# Patient Record
Sex: FEMALE | Race: WHITE | NOT HISPANIC OR LATINO | URBAN - METROPOLITAN AREA
[De-identification: names, ages, dates, MRNs, and addresses within clinical notes are randomized per-mention and may not be internally consistent; named-entity substitution may affect disease eponyms.]

---

## 2017-03-27 ENCOUNTER — OUTPATIENT (OUTPATIENT)
Dept: OUTPATIENT SERVICES | Facility: HOSPITAL | Age: 47
LOS: 1 days | Discharge: HOME | End: 2017-03-27

## 2017-06-27 DIAGNOSIS — Z52.001 UNSPECIFIED DONOR, STEM CELLS: ICD-10-CM

## 2022-02-17 PROBLEM — Z00.00 ENCOUNTER FOR PREVENTIVE HEALTH EXAMINATION: Status: ACTIVE | Noted: 2022-02-17

## 2022-02-22 ENCOUNTER — APPOINTMENT (OUTPATIENT)
Dept: CARDIOLOGY | Facility: CLINIC | Age: 52
End: 2022-02-22

## 2022-04-14 ENCOUNTER — OUTPATIENT (OUTPATIENT)
Dept: OUTPATIENT SERVICES | Facility: HOSPITAL | Age: 52
LOS: 1 days | Discharge: HOME | End: 2022-04-14
Payer: MEDICAID

## 2022-04-14 VITALS
DIASTOLIC BLOOD PRESSURE: 86 MMHG | HEART RATE: 86 BPM | TEMPERATURE: 98 F | RESPIRATION RATE: 16 BRPM | WEIGHT: 179.02 LBS | OXYGEN SATURATION: 98 % | HEIGHT: 67 IN | SYSTOLIC BLOOD PRESSURE: 154 MMHG

## 2022-04-14 DIAGNOSIS — Z01.818 ENCOUNTER FOR OTHER PREPROCEDURAL EXAMINATION: ICD-10-CM

## 2022-04-14 DIAGNOSIS — S83.231D COMPLEX TEAR OF MEDIAL MENISCUS, CURRENT INJURY, RIGHT KNEE, SUBSEQUENT ENCOUNTER: ICD-10-CM

## 2022-04-14 LAB
ALBUMIN SERPL ELPH-MCNC: 5.1 G/DL — SIGNIFICANT CHANGE UP (ref 3.5–5.2)
ALP SERPL-CCNC: 181 U/L — HIGH (ref 30–115)
ALT FLD-CCNC: 34 U/L — SIGNIFICANT CHANGE UP (ref 0–41)
ANION GAP SERPL CALC-SCNC: 17 MMOL/L — HIGH (ref 7–14)
APTT BLD: 47.2 SEC — HIGH (ref 27–39.2)
AST SERPL-CCNC: 31 U/L — SIGNIFICANT CHANGE UP (ref 0–41)
BASOPHILS # BLD AUTO: 0.12 K/UL — SIGNIFICANT CHANGE UP (ref 0–0.2)
BASOPHILS NFR BLD AUTO: 1.2 % — HIGH (ref 0–1)
BILIRUB SERPL-MCNC: 0.4 MG/DL — SIGNIFICANT CHANGE UP (ref 0.2–1.2)
BUN SERPL-MCNC: 12 MG/DL — SIGNIFICANT CHANGE UP (ref 10–20)
CALCIUM SERPL-MCNC: 10 MG/DL — SIGNIFICANT CHANGE UP (ref 8.5–10.1)
CHLORIDE SERPL-SCNC: 81 MMOL/L — LOW (ref 98–110)
CO2 SERPL-SCNC: 25 MMOL/L — SIGNIFICANT CHANGE UP (ref 17–32)
CREAT SERPL-MCNC: 0.6 MG/DL — LOW (ref 0.7–1.5)
EGFR: 107 ML/MIN/1.73M2 — SIGNIFICANT CHANGE UP
EOSINOPHIL # BLD AUTO: 0.5 K/UL — SIGNIFICANT CHANGE UP (ref 0–0.7)
EOSINOPHIL NFR BLD AUTO: 5.1 % — SIGNIFICANT CHANGE UP (ref 0–8)
GLUCOSE SERPL-MCNC: 93 MG/DL — SIGNIFICANT CHANGE UP (ref 70–99)
HCT VFR BLD CALC: 40.7 % — SIGNIFICANT CHANGE UP (ref 37–47)
HGB BLD-MCNC: 14.7 G/DL — SIGNIFICANT CHANGE UP (ref 12–16)
IMM GRANULOCYTES NFR BLD AUTO: 0.5 % — HIGH (ref 0.1–0.3)
INR BLD: 0.9 RATIO — SIGNIFICANT CHANGE UP (ref 0.65–1.3)
LYMPHOCYTES # BLD AUTO: 1.01 K/UL — LOW (ref 1.2–3.4)
LYMPHOCYTES # BLD AUTO: 10.3 % — LOW (ref 20.5–51.1)
MCHC RBC-ENTMCNC: 35 PG — HIGH (ref 27–31)
MCHC RBC-ENTMCNC: 36.1 G/DL — SIGNIFICANT CHANGE UP (ref 32–37)
MCV RBC AUTO: 96.9 FL — SIGNIFICANT CHANGE UP (ref 81–99)
MONOCYTES # BLD AUTO: 0.75 K/UL — HIGH (ref 0.1–0.6)
MONOCYTES NFR BLD AUTO: 7.7 % — SIGNIFICANT CHANGE UP (ref 1.7–9.3)
NEUTROPHILS # BLD AUTO: 7.37 K/UL — HIGH (ref 1.4–6.5)
NEUTROPHILS NFR BLD AUTO: 75.2 % — SIGNIFICANT CHANGE UP (ref 42.2–75.2)
NRBC # BLD: 0 /100 WBCS — SIGNIFICANT CHANGE UP (ref 0–0)
PLATELET # BLD AUTO: 463 K/UL — HIGH (ref 130–400)
POTASSIUM SERPL-MCNC: 5.3 MMOL/L — HIGH (ref 3.5–5)
POTASSIUM SERPL-SCNC: 5.3 MMOL/L — HIGH (ref 3.5–5)
PROT SERPL-MCNC: 8 G/DL — SIGNIFICANT CHANGE UP (ref 6–8)
PROTHROM AB SERPL-ACNC: 10.4 SEC — SIGNIFICANT CHANGE UP (ref 9.95–12.87)
RBC # BLD: 4.2 M/UL — SIGNIFICANT CHANGE UP (ref 4.2–5.4)
RBC # FLD: 11.2 % — LOW (ref 11.5–14.5)
SODIUM SERPL-SCNC: 123 MMOL/L — LOW (ref 135–146)
WBC # BLD: 9.8 K/UL — SIGNIFICANT CHANGE UP (ref 4.8–10.8)
WBC # FLD AUTO: 9.8 K/UL — SIGNIFICANT CHANGE UP (ref 4.8–10.8)

## 2022-04-14 PROCEDURE — 93010 ELECTROCARDIOGRAM REPORT: CPT

## 2022-04-14 RX ORDER — CLONAZEPAM 1 MG
1 TABLET ORAL
Qty: 0 | Refills: 0 | DISCHARGE

## 2022-04-14 NOTE — H&P PST ADULT - NSICDXPASTMEDICALHX_GEN_ALL_CORE_FT
PAST MEDICAL HISTORY:  Bipolar disorder anxiety and depression -denies suicidal ideas    HTN (hypertension)

## 2022-04-14 NOTE — H&P PST ADULT - HISTORY OF PRESENT ILLNESS
51 yr old female states pain RT knee  for about 6months -progressively worsened -" I did PT for a while like 4 months but it got worse" difficulty negotiating stairs and limits daily  activities d/t pain is scheduled RIGHT KNEE ARTHROSCOPY MEDIAL AND LATERAL MENISCECTOMY. Denies COVID S/S. Recd 3 doses vaccine. Verbalized understanding of COVID prevention measures. Exercise bhakti 1-2 FOS very slowly with support LTD by pain RT knee.  Anesthesia Alert  NO--Difficult Airway  NO--History of neck surgery or radiation  NO--Limited ROM of neck  NO--History of Malignant hyperthermia  NO--Personal or family history of Pseudocholinesterase deficiency  NO--Prior Anesthesia Complication  NO--Latex Allergy  NO--Loose teeth  NO--History of Rheumatoid Arthritis  NO--TIM  No Bleeding risk  NO--Other_____

## 2022-04-15 ENCOUNTER — INPATIENT (INPATIENT)
Facility: HOSPITAL | Age: 52
LOS: 0 days | Discharge: HOME | End: 2022-04-16
Attending: HOSPITALIST | Admitting: HOSPITALIST
Payer: MEDICAID

## 2022-04-15 VITALS — HEIGHT: 67 IN

## 2022-04-15 PROBLEM — I10 ESSENTIAL (PRIMARY) HYPERTENSION: Chronic | Status: ACTIVE | Noted: 2022-04-14

## 2022-04-15 PROBLEM — F31.9 BIPOLAR DISORDER, UNSPECIFIED: Chronic | Status: ACTIVE | Noted: 2022-04-14

## 2022-04-15 LAB
ALBUMIN SERPL ELPH-MCNC: 5.5 G/DL — HIGH (ref 3.5–5.2)
ALP SERPL-CCNC: 176 U/L — HIGH (ref 30–115)
ALT FLD-CCNC: 35 U/L — SIGNIFICANT CHANGE UP (ref 0–41)
ANION GAP SERPL CALC-SCNC: 11 MMOL/L — SIGNIFICANT CHANGE UP (ref 7–14)
ANION GAP SERPL CALC-SCNC: 14 MMOL/L — SIGNIFICANT CHANGE UP (ref 7–14)
APPEARANCE UR: CLEAR — SIGNIFICANT CHANGE UP
AST SERPL-CCNC: 32 U/L — SIGNIFICANT CHANGE UP (ref 0–41)
BASOPHILS # BLD AUTO: 0.12 K/UL — SIGNIFICANT CHANGE UP (ref 0–0.2)
BASOPHILS NFR BLD AUTO: 1.1 % — HIGH (ref 0–1)
BILIRUB SERPL-MCNC: 0.6 MG/DL — SIGNIFICANT CHANGE UP (ref 0.2–1.2)
BILIRUB UR-MCNC: NEGATIVE — SIGNIFICANT CHANGE UP
BUN SERPL-MCNC: 12 MG/DL — SIGNIFICANT CHANGE UP (ref 10–20)
BUN SERPL-MCNC: 15 MG/DL — SIGNIFICANT CHANGE UP (ref 10–20)
CALCIUM SERPL-MCNC: 10.3 MG/DL — HIGH (ref 8.5–10.1)
CALCIUM SERPL-MCNC: 9.9 MG/DL — SIGNIFICANT CHANGE UP (ref 8.5–10.1)
CHLORIDE SERPL-SCNC: 80 MMOL/L — LOW (ref 98–110)
CHLORIDE SERPL-SCNC: 90 MMOL/L — LOW (ref 98–110)
CO2 SERPL-SCNC: 26 MMOL/L — SIGNIFICANT CHANGE UP (ref 17–32)
CO2 SERPL-SCNC: 27 MMOL/L — SIGNIFICANT CHANGE UP (ref 17–32)
COLOR SPEC: YELLOW — SIGNIFICANT CHANGE UP
CREAT SERPL-MCNC: 0.6 MG/DL — LOW (ref 0.7–1.5)
CREAT SERPL-MCNC: 0.7 MG/DL — SIGNIFICANT CHANGE UP (ref 0.7–1.5)
DIFF PNL FLD: NEGATIVE — SIGNIFICANT CHANGE UP
EGFR: 103 ML/MIN/1.73M2 — SIGNIFICANT CHANGE UP
EGFR: 107 ML/MIN/1.73M2 — SIGNIFICANT CHANGE UP
EOSINOPHIL # BLD AUTO: 0.38 K/UL — SIGNIFICANT CHANGE UP (ref 0–0.7)
EOSINOPHIL NFR BLD AUTO: 3.5 % — SIGNIFICANT CHANGE UP (ref 0–8)
GLUCOSE SERPL-MCNC: 133 MG/DL — HIGH (ref 70–99)
GLUCOSE SERPL-MCNC: 88 MG/DL — SIGNIFICANT CHANGE UP (ref 70–99)
GLUCOSE UR QL: NEGATIVE MG/DL — SIGNIFICANT CHANGE UP
HCT VFR BLD CALC: 43.6 % — SIGNIFICANT CHANGE UP (ref 37–47)
HGB BLD-MCNC: 15.4 G/DL — SIGNIFICANT CHANGE UP (ref 12–16)
IMM GRANULOCYTES NFR BLD AUTO: 0.5 % — HIGH (ref 0.1–0.3)
KETONES UR-MCNC: NEGATIVE — SIGNIFICANT CHANGE UP
LEUKOCYTE ESTERASE UR-ACNC: NEGATIVE — SIGNIFICANT CHANGE UP
LYMPHOCYTES # BLD AUTO: 1.04 K/UL — LOW (ref 1.2–3.4)
LYMPHOCYTES # BLD AUTO: 9.5 % — LOW (ref 20.5–51.1)
MCHC RBC-ENTMCNC: 34.1 PG — HIGH (ref 27–31)
MCHC RBC-ENTMCNC: 35.3 G/DL — SIGNIFICANT CHANGE UP (ref 32–37)
MCV RBC AUTO: 96.5 FL — SIGNIFICANT CHANGE UP (ref 81–99)
MONOCYTES # BLD AUTO: 0.59 K/UL — SIGNIFICANT CHANGE UP (ref 0.1–0.6)
MONOCYTES NFR BLD AUTO: 5.4 % — SIGNIFICANT CHANGE UP (ref 1.7–9.3)
NEUTROPHILS # BLD AUTO: 8.74 K/UL — HIGH (ref 1.4–6.5)
NEUTROPHILS NFR BLD AUTO: 80 % — HIGH (ref 42.2–75.2)
NITRITE UR-MCNC: NEGATIVE — SIGNIFICANT CHANGE UP
NRBC # BLD: 0 /100 WBCS — SIGNIFICANT CHANGE UP (ref 0–0)
OSMOLALITY UR: 192 MOS/KG — SIGNIFICANT CHANGE UP (ref 50–1200)
PH UR: 7 — SIGNIFICANT CHANGE UP (ref 5–8)
PLATELET # BLD AUTO: 457 K/UL — HIGH (ref 130–400)
POTASSIUM SERPL-MCNC: 4.9 MMOL/L — SIGNIFICANT CHANGE UP (ref 3.5–5)
POTASSIUM SERPL-MCNC: 5 MMOL/L — SIGNIFICANT CHANGE UP (ref 3.5–5)
POTASSIUM SERPL-SCNC: 4.9 MMOL/L — SIGNIFICANT CHANGE UP (ref 3.5–5)
POTASSIUM SERPL-SCNC: 5 MMOL/L — SIGNIFICANT CHANGE UP (ref 3.5–5)
PROT SERPL-MCNC: 8.1 G/DL — HIGH (ref 6–8)
PROT UR-MCNC: NEGATIVE MG/DL — SIGNIFICANT CHANGE UP
RBC # BLD: 4.52 M/UL — SIGNIFICANT CHANGE UP (ref 4.2–5.4)
RBC # FLD: 11.2 % — LOW (ref 11.5–14.5)
SARS-COV-2 RNA SPEC QL NAA+PROBE: SIGNIFICANT CHANGE UP
SODIUM SERPL-SCNC: 121 MMOL/L — LOW (ref 135–146)
SODIUM SERPL-SCNC: 127 MMOL/L — LOW (ref 135–146)
SODIUM UR-SCNC: <20 MMOL/L — SIGNIFICANT CHANGE UP
SP GR SPEC: 1.01 — SIGNIFICANT CHANGE UP (ref 1.01–1.03)
UROBILINOGEN FLD QL: 0.2 MG/DL — SIGNIFICANT CHANGE UP
WBC # BLD: 10.93 K/UL — HIGH (ref 4.8–10.8)
WBC # FLD AUTO: 10.93 K/UL — HIGH (ref 4.8–10.8)

## 2022-04-15 PROCEDURE — 93010 ELECTROCARDIOGRAM REPORT: CPT

## 2022-04-15 PROCEDURE — 99285 EMERGENCY DEPT VISIT HI MDM: CPT

## 2022-04-15 PROCEDURE — 99222 1ST HOSP IP/OBS MODERATE 55: CPT

## 2022-04-15 RX ORDER — CLONAZEPAM 1 MG
1 TABLET ORAL THREE TIMES A DAY
Refills: 0 | Status: DISCONTINUED | OUTPATIENT
Start: 2022-04-15 | End: 2022-04-16

## 2022-04-15 RX ORDER — NICOTINE POLACRILEX 2 MG
1 GUM BUCCAL DAILY
Refills: 0 | Status: DISCONTINUED | OUTPATIENT
Start: 2022-04-15 | End: 2022-04-16

## 2022-04-15 RX ORDER — ACETAMINOPHEN 500 MG
650 TABLET ORAL EVERY 6 HOURS
Refills: 0 | Status: DISCONTINUED | OUTPATIENT
Start: 2022-04-15 | End: 2022-04-16

## 2022-04-15 RX ORDER — OLANZAPINE 15 MG/1
15 TABLET, FILM COATED ORAL AT BEDTIME
Refills: 0 | Status: DISCONTINUED | OUTPATIENT
Start: 2022-04-15 | End: 2022-04-16

## 2022-04-15 RX ORDER — LOSARTAN POTASSIUM 100 MG/1
50 TABLET, FILM COATED ORAL DAILY
Refills: 0 | Status: DISCONTINUED | OUTPATIENT
Start: 2022-04-15 | End: 2022-04-16

## 2022-04-15 RX ORDER — SERTRALINE 25 MG/1
50 TABLET, FILM COATED ORAL DAILY
Refills: 0 | Status: DISCONTINUED | OUTPATIENT
Start: 2022-04-15 | End: 2022-04-16

## 2022-04-15 RX ORDER — ONDANSETRON 8 MG/1
4 TABLET, FILM COATED ORAL EVERY 8 HOURS
Refills: 0 | Status: DISCONTINUED | OUTPATIENT
Start: 2022-04-15 | End: 2022-04-16

## 2022-04-15 RX ORDER — CLONAZEPAM 1 MG
0 TABLET ORAL
Qty: 0 | Refills: 0 | DISCHARGE

## 2022-04-15 RX ORDER — LANOLIN ALCOHOL/MO/W.PET/CERES
3 CREAM (GRAM) TOPICAL AT BEDTIME
Refills: 0 | Status: DISCONTINUED | OUTPATIENT
Start: 2022-04-15 | End: 2022-04-16

## 2022-04-15 RX ORDER — LOSARTAN POTASSIUM 100 MG/1
1 TABLET, FILM COATED ORAL
Qty: 0 | Refills: 0 | DISCHARGE

## 2022-04-15 RX ORDER — CLONAZEPAM 1 MG
2 TABLET ORAL
Qty: 0 | Refills: 0 | DISCHARGE

## 2022-04-15 RX ORDER — ENOXAPARIN SODIUM 100 MG/ML
40 INJECTION SUBCUTANEOUS EVERY 24 HOURS
Refills: 0 | Status: DISCONTINUED | OUTPATIENT
Start: 2022-04-15 | End: 2022-04-16

## 2022-04-15 RX ADMIN — Medication 1 MILLIGRAM(S): at 21:55

## 2022-04-15 RX ADMIN — OLANZAPINE 15 MILLIGRAM(S): 15 TABLET, FILM COATED ORAL at 21:55

## 2022-04-15 RX ADMIN — ENOXAPARIN SODIUM 40 MILLIGRAM(S): 100 INJECTION SUBCUTANEOUS at 21:57

## 2022-04-15 RX ADMIN — Medication 1 PATCH: at 16:26

## 2022-04-15 RX ADMIN — Medication 3 MILLIGRAM(S): at 21:56

## 2022-04-15 NOTE — ED PROVIDER NOTE - ATTENDING CONTRIBUTION TO CARE
Patient sent to the emergency department for possible hyponatremia.  Patient was having preadmission surgical testing in anticipation of knee surgery.  Hyponatremia was noted on that assessment.  Via patient's only symptom at this time is mild lethargy.  She admits to going on a diet which involved drinking " gallons" of water.  No change in medication.  Patient does not take diuretics.  Vital signs noted.  NAD.  Neuro intact.  Chest is clear.  Case discussed with Dr. Euceda.  Patient is stable for the floor at this time.  Recommends repeating sodium in 12 hours.  Case discussed with Dr. Houser.  Needs fluid restriction.

## 2022-04-15 NOTE — H&P ADULT - NS ATTEND AMEND GEN_ALL_CORE FT
Patient seen and examined at bedside. Not in acute distress.   Agree with the assessment and plan above. Changes made to HPI and plan as needed.

## 2022-04-15 NOTE — PATIENT PROFILE ADULT - FALL HARM RISK - UNIVERSAL INTERVENTIONS
Bed in lowest position, wheels locked, appropriate side rails in place/Call bell, personal items and telephone in reach/Instruct patient to call for assistance before getting out of bed or chair/Non-slip footwear when patient is out of bed/[place] to call system/Physically safe environment - no spills, clutter or unnecessary equipment/Purposeful Proactive Rounding/Room/bathroom lighting operational, light cord in reach

## 2022-04-15 NOTE — H&P ADULT - NSHPPHYSICALEXAM_GEN_ALL_CORE
Vital Signs (24 Hrs):  T(C): 36.3 (04-15-22 @ 10:49), Max: 36.3 (04-15-22 @ 10:49)  HR: 95 (04-15-22 @ 10:49) (95 - 95)  BP: 152/78 (04-15-22 @ 10:49) (152/78 - 152/78)  RR: 18 (04-15-22 @ 10:49) (18 - 18)  SpO2: 96% (04-15-22 @ 10:49) (96% - 96%)    PHYSICAL EXAM:  GENERAL: NAD, well-developed  SKIN: No rashes or lesions  HEAD:  Atraumatic, Normocephalic  EYES: EOMI, PERRLA, conjunctiva and sclera clear  NECK: Supple, No JVD  CHEST/LUNG: Clear to auscultation bilaterally; No wheeze  HEART: Regular rate and rhythm; No murmurs, rubs, or gallops  ABDOMEN: Soft, Nontender, Nondistended; Bowel sounds present  EXTREMITIES:  No clubbing, cyanosis, or edema  CNS: AAOx3

## 2022-04-15 NOTE — ED PROVIDER NOTE - CLINICAL SUMMARY MEDICAL DECISION MAKING FREE TEXT BOX
Patient's sodium noted.  She is currently symptomatic with mild lethargy.  Symptoms are most likely due to polydipsia due to a self prescribed diet.  At first the patient we wish to leave AMA.  However, I discussed the risk of seizure.  Finally, should she relented and agreed to admission.  In my opinion inpatient management of this condition is medically justifiable because of high seizure risk.

## 2022-04-15 NOTE — ED PROVIDER NOTE - NSFOLLOWUPINSTRUCTIONS_ED_ALL_ED_FT
Stop drinking water.  If you must drink fluids drink broth or Gatorade.  No more than a pint for the entire 24 hours.  Return to the ER tomorrow for reevaluation.  Return to the ER if there is any change in your mental status or if you have a seizure.  Return to the ER if you change your mind about admission.

## 2022-04-15 NOTE — ED PROVIDER NOTE - NS ED ROS FT
Review of Systems:  CONSTITUTIONAL - No fever  SKIN - No rash  HEMATOLOGIC - No abnormal bleeding or bruising  EYES - No eye pain, No blurred vision  RESPIRATORY - No shortness of breath, No cough  CARDIAC -No chest pain, No palpitations  GI - No abdominal pain, No nausea, No vomiting, No diarrhea  MUSCULOSKELETAL - No joint paint, No swelling, No back pain  NEUROLOGIC - No numbness, No focal weakness, No headache, No dizziness  All other systems negative, unless specified in HPI

## 2022-04-15 NOTE — H&P ADULT - NSHPLABSRESULTS_GEN_ALL_CORE
15.4   10.93 )-----------( 457      ( 15 Apr 2022 12:00 )             43.6     04-15    121<L>  |  80<L>  |  12  ----------------------------<  133<H>  5.0   |  27  |  0.6<L>    Ca    10.3<H>      15 Apr 2022 12:00    TPro  8.1<H>  /  Alb  5.5<H>  /  TBili  0.6  /  DBili  x   /  AST  32  /  ALT  35  /  AlkPhos  176<H>  04-15          Urinalysis Basic - ( 15 Apr 2022 12:00 )    Color: Yellow / Appearance: Clear / S.015 / pH: x  Gluc: x / Ketone: Negative  / Bili: Negative / Urobili: 0.2 mg/dL   Blood: x / Protein: Negative mg/dL / Nitrite: Negative   Leuk Esterase: Negative / RBC: x / WBC x   Sq Epi: x / Non Sq Epi: x / Bacteria: x      PT/INR - ( 2022 07:28 )   PT: 10.40 sec;   INR: 0.90 ratio         PTT - ( 2022 07:28 )  PTT:47.2 sec

## 2022-04-15 NOTE — H&P ADULT - HISTORY OF PRESENT ILLNESS
51F w/PMHx of HTN, Bipolar D/O presents to ED for evaluation of abnormal lab.  Patient reports that she had pre-op labs done for right knee arthroscopy earlier in the week.  She reports she was told she had low Na+ and to come to ED for evaluation.  Patient reports some lethargy.  No confusion, muscle weakness.  Patient admits to drinking 3-4L of water per day and also has EtOH seltzer at night.  No CP/SOB.  No fever/chills.  No abdominal or urinary complaints.  Patient does report that her medication ws changed from Losartan to Losartan/HCTZ approx 1-2 months ago 2/2 persistent HTN.   51 female with hx of HTN, Bipolar D/O presents to ED for evaluation of abnormal lab.  Patient reports that she had pre-op labs done for right knee arthroscopy earlier in the week.  She reports she was told she had low Na+ and to come to ED for evaluation.  Patient reports some lethargy.  No confusion, muscle weakness.  Patient admits to drinking 3-4L of water per day and also has EtOH seltzer at night.  No CP/SOB.  No fever/chills.  No abdominal or urinary complaints.  Patient does report that her medication ws changed from Losartan to Losartan/HCTZ approx 1-2 months ago 2/2 persistent HTN.

## 2022-04-15 NOTE — ED PROVIDER NOTE - PATIENT PORTAL LINK FT
You can access the FollowMyHealth Patient Portal offered by [facility] by registering at the following website: [email or web address]. By joining [employer]’s FollowMyHealth portal, you will also be able to view your health information using other applications (apps) compatible with our system.

## 2022-04-15 NOTE — ED PROVIDER NOTE - OBJECTIVE STATEMENT
51Y F with PMH of Bipolar disorder on Zoloft, Clonidine, Olanzapine presents with CC of hyponatremia. Patient reports that she was getting routine blood work for pre op yesterday and was found to be hyponatremic to 123 and sent in for eval. Feels tired. Denies fevers, HA, confusion, chest pain, SOB, abdominal pain, numbness/tingling, weakness. Patient reports to being on low salt diet for the last year. Patient did have normal blood work in March with PMD. Denies other new medications. Urinating output normal.

## 2022-04-15 NOTE — ED ADULT TRIAGE NOTE - CHIEF COMPLAINT QUOTE
Patient states "I have knee surgery on 28 but post op blood work showed I have 123 sodium and told me to come in".

## 2022-04-15 NOTE — H&P ADULT - ASSESSMENT
51F w/PMHx of HTN, Bipolar D/O presents to ED for evaluation of abnormal lab, admitted to medicine service for further management.    Asymptomatic Hypotonic Euvolemic Hyponatremia (calculated serum Osmo 254)  - admit to medicine service  - low Na+ likely 2/2 increased PO free water and HCTZ  - Hold HCTZ  - 1.5L Na+ restriction  - FU uOSM and Katlyn+  - recheck BMP at 2200 and AM (do not correct more than 6-8 mmol/L in 24h)  - Renal c/s if worsening    HTN  - hold HCTZ  - c/w Losartan  - monitor b/p and adjust accordingly     Bipolar d/o  - c/w Olanzapine and Zoloft 51 yr old female with hx of HTN, Bipolar D/O presents to ED for evaluation of abnormal lab, admitted to medicine service for further management.    Asymptomatic Hypotonic Euvolemic Hyponatremia (calculated serum Osmo 254)  - admit to medicine service  - low Na+ likely 2/2 increased PO free water and HCTZ  - Hold HCTZ  - 1.5L Na+ restriction  - FU uOSM and Katlyn+  - recheck BMP at 2200 and AM (do not correct more than 6-8 mmol/L in 24h)  - Renal c/s if worsening    HTN  - hold HCTZ  - c/w Losartan  - monitor b/p and adjust accordingly     Bipolar d/o  - c/w Olanzapine and Zoloft    Nicotine abuse  ETOH abuse  - counselled to cut down etoh and quit smoking  - start nicotine patch     Anticipate for discharge    Ambulate as tolerated    DVT ppx    Full code

## 2022-04-16 ENCOUNTER — TRANSCRIPTION ENCOUNTER (OUTPATIENT)
Age: 52
End: 2022-04-16

## 2022-04-16 VITALS
DIASTOLIC BLOOD PRESSURE: 87 MMHG | SYSTOLIC BLOOD PRESSURE: 149 MMHG | TEMPERATURE: 96 F | HEART RATE: 83 BPM | RESPIRATION RATE: 18 BRPM

## 2022-04-16 LAB
ANION GAP SERPL CALC-SCNC: 13 MMOL/L — SIGNIFICANT CHANGE UP (ref 7–14)
ANION GAP SERPL CALC-SCNC: 9 MMOL/L — SIGNIFICANT CHANGE UP (ref 7–14)
BUN SERPL-MCNC: 11 MG/DL — SIGNIFICANT CHANGE UP (ref 10–20)
BUN SERPL-MCNC: 15 MG/DL — SIGNIFICANT CHANGE UP (ref 10–20)
CALCIUM SERPL-MCNC: 10 MG/DL — SIGNIFICANT CHANGE UP (ref 8.5–10.1)
CALCIUM SERPL-MCNC: 9.9 MG/DL — SIGNIFICANT CHANGE UP (ref 8.5–10.1)
CHLORIDE SERPL-SCNC: 91 MMOL/L — LOW (ref 98–110)
CHLORIDE SERPL-SCNC: 93 MMOL/L — LOW (ref 98–110)
CO2 SERPL-SCNC: 24 MMOL/L — SIGNIFICANT CHANGE UP (ref 17–32)
CO2 SERPL-SCNC: 25 MMOL/L — SIGNIFICANT CHANGE UP (ref 17–32)
CREAT SERPL-MCNC: 0.6 MG/DL — LOW (ref 0.7–1.5)
CREAT SERPL-MCNC: 0.9 MG/DL — SIGNIFICANT CHANGE UP (ref 0.7–1.5)
EGFR: 107 ML/MIN/1.73M2 — SIGNIFICANT CHANGE UP
EGFR: 76 ML/MIN/1.73M2 — SIGNIFICANT CHANGE UP
GLUCOSE SERPL-MCNC: 110 MG/DL — HIGH (ref 70–99)
GLUCOSE SERPL-MCNC: 118 MG/DL — HIGH (ref 70–99)
POTASSIUM SERPL-MCNC: 4.7 MMOL/L — SIGNIFICANT CHANGE UP (ref 3.5–5)
POTASSIUM SERPL-MCNC: 4.8 MMOL/L — SIGNIFICANT CHANGE UP (ref 3.5–5)
POTASSIUM SERPL-SCNC: 4.7 MMOL/L — SIGNIFICANT CHANGE UP (ref 3.5–5)
POTASSIUM SERPL-SCNC: 4.8 MMOL/L — SIGNIFICANT CHANGE UP (ref 3.5–5)
SODIUM SERPL-SCNC: 127 MMOL/L — LOW (ref 135–146)
SODIUM SERPL-SCNC: 128 MMOL/L — LOW (ref 135–146)

## 2022-04-16 PROCEDURE — 99239 HOSP IP/OBS DSCHRG MGMT >30: CPT

## 2022-04-16 RX ORDER — ACETAMINOPHEN 500 MG
2 TABLET ORAL
Qty: 0 | Refills: 0 | DISCHARGE

## 2022-04-16 RX ORDER — LOSARTAN/HYDROCHLOROTHIAZIDE 100MG-25MG
1 TABLET ORAL
Qty: 0 | Refills: 0 | DISCHARGE

## 2022-04-16 RX ADMIN — Medication 1 PATCH: at 07:48

## 2022-04-16 RX ADMIN — LOSARTAN POTASSIUM 50 MILLIGRAM(S): 100 TABLET, FILM COATED ORAL at 05:52

## 2022-04-16 RX ADMIN — Medication 650 MILLIGRAM(S): at 12:26

## 2022-04-16 RX ADMIN — Medication 650 MILLIGRAM(S): at 11:08

## 2022-04-16 RX ADMIN — Medication 650 MILLIGRAM(S): at 16:40

## 2022-04-16 RX ADMIN — Medication 1 PATCH: at 11:09

## 2022-04-16 RX ADMIN — SERTRALINE 50 MILLIGRAM(S): 25 TABLET, FILM COATED ORAL at 11:46

## 2022-04-16 RX ADMIN — Medication 1 MILLIGRAM(S): at 05:52

## 2022-04-16 NOTE — DISCHARGE NOTE PROVIDER - HOSPITAL COURSE
51 female with hx of HTN, Bipolar D/O presents to ED for evaluation of abnormal lab.  Patient reports that she had pre-op labs done for right knee arthroscopy earlier in the week.  She reports she was told she had low Na+ and to come to ED for evaluation.  Patient reports some lethargy.  No confusion, muscle weakness.  Patient admits to drinking 3-4L of water per day and also has EtOH seltzer at night.  No CP/SOB.  No fever/chills.  No abdominal or urinary complaints.  Patient does report that her medication ws changed from Losartan to Losartan/HCTZ approx 1-2 months ago 2/2 persistent HTN.      Pt was admitted to medicine for asymptomatic Hypotonic Euvolemic Hyponatremia likely 2/2 increased PO free water and HCTZ.   HCTZ was held and 1.5L fluid restriction  S Na improved: 121 --> 127  Pt stable to be discharge      51 female with hx of HTN, Bipolar D/O presents to ED for evaluation of abnormal lab.  Patient reports that she had pre-op labs done for right knee arthroscopy earlier in the week.  She reports she was told she had low Na+ and to come to ED for evaluation.  Patient reports some lethargy.  No confusion, muscle weakness.  Patient admits to drinking 3-4L of water per day and also has EtOH seltzer at night.  No CP/SOB.  No fever/chills.  No abdominal or urinary complaints.  Patient does report that her medication ws changed from Losartan to Losartan/HCTZ approx 1-2 months ago 2/2 persistent HTN.      Pt was admitted to medicine for asymptomatic Hypotonic Euvolemic Hyponatremia likely 2/2 increased PO free water and HCTZ.   HCTZ was held and 1.5L fluid restriction  S Na improved: 121 --> 127  Pt stable to be discharge with close outpatient follow up with PCP and repeat BMP. Pt was instructed to hold HCTZ until she f/u with PCP.

## 2022-04-16 NOTE — DISCHARGE NOTE NURSING/CASE MANAGEMENT/SOCIAL WORK - PATIENT PORTAL LINK FT
You can access the FollowMyHealth Patient Portal offered by [facility] by registering at the following website: [email or web address]. By joining [employer]’s FollowMyHealth portal, you will also be able to view your health information using other applications (apps) compatible with our system.

## 2022-04-16 NOTE — DISCHARGE NOTE PROVIDER - NSDCFUADDINST_GEN_ALL_CORE_FT
Pls restrict your fluid intake to 2 - 2.5L per day Pls restrict your fluid intake to 1 - 1.5L per day

## 2022-04-16 NOTE — DISCHARGE NOTE PROVIDER - ATTENDING ATTESTATION STATEMENT
Patient would like communication of their results via:        Cell Phone:   Telephone Information:   Mobile [phone]     Okay to leave a message containing results? Yes    May leave message with wife, Allie     I have personally seen and examined the patient. I have collaborated with and supervised the

## 2022-04-16 NOTE — DISCHARGE NOTE PROVIDER - PROVIDER TOKENS
PROVIDER:[TOKEN:[[id number]],FOLLOWUP:[1 week]] PROVIDER:[TOKEN:[[id number]],FOLLOWUP:[1-3 days]]

## 2022-04-16 NOTE — DISCHARGE NOTE NURSING/CASE MANAGEMENT/SOCIAL WORK - NSDCPEFALRISK_GEN_ALL_CORE
For information on Fall & Injury Prevention, visit: [email or web address] OR  [email or web address] OR  https://www.cdc.gov/steadi/patient.html

## 2022-04-16 NOTE — DISCHARGE NOTE PROVIDER - CARE PROVIDER_API CALL
EMILEE BREWSTEREE  Internal Medicine  [address] [place] [address]  Phone: ([phone]  Fax: ()-  Follow Up Time: 1 week   EMILEE BREWSTEREE  Internal Medicine  [address] [place] [address]  Phone: ()-  Fax: ()-  Follow Up Time: 1-3 days

## 2022-04-16 NOTE — DISCHARGE NOTE NURSING/CASE MANAGEMENT/SOCIAL WORK - NSDCPEWEB_GEN_ALL_CORE
[facility] for Tobacco Control website --- [email or web address] website --- [email or web address]

## 2022-04-16 NOTE — DISCHARGE NOTE PROVIDER - NSDCMRMEDTOKEN_GEN_ALL_CORE_FT
KlonoPIN 1 mg oral tablet: 1 tab(s) orally 3 times a day, As Needed  losartan-hydrochlorothiazide 50 mg-12.5 mg oral tablet: 1 tab(s) orally once a day  OLANZapine 15 mg oral tablet: 1 tab(s) orally once a day (at bedtime)  Zoloft 50 mg oral tablet: 1 tab(s) orally once a day   KlonoPIN 1 mg oral tablet: 1 tab(s) orally 3 times a day, As Needed  OLANZapine 15 mg oral tablet: 1 tab(s) orally once a day (at bedtime)  Zoloft 50 mg oral tablet: 1 tab(s) orally once a day

## 2022-04-16 NOTE — DISCHARGE NOTE NURSING/CASE MANAGEMENT/SOCIAL WORK - NSDCPEEMAIL_GEN_ALL_CORE
[facility] for Tobacco Control email [email or web address]

## 2022-04-16 NOTE — DISCHARGE NOTE PROVIDER - NSDCCPCAREPLAN_GEN_ALL_CORE_FT
PRINCIPAL DISCHARGE DIAGNOSIS  Diagnosis: Hyponatremia  Assessment and Plan of Treatment: treated and improved       PRINCIPAL DISCHARGE DIAGNOSIS  Diagnosis: Hyponatremia  Assessment and Plan of Treatment: treated and improved  please limit water intake and hold HCTZ until being seen by PCP  you were instructed to follow up with your PCP in 1-2 days and have repeat BMP done.

## 2022-04-16 NOTE — CHART NOTE - NSCHARTNOTEFT_GEN_A_CORE
Repeat  up from 123 on admission.   Results d/w pt at length. Pt requested to be discharged today and said she will follow up with PCP on Monday and have repeat blood work done there.  Pt was instructed to limit water intake to 1.5 L and hold HCTZ until cleared by PCP. Pt verbalized understanding.   Discharge order placed as per Dr. Daily.

## 2022-04-16 NOTE — DISCHARGE NOTE NURSING/CASE MANAGEMENT/SOCIAL WORK - NSDCPETBCESMAN_GEN_ALL_CORE
Name: Tahira Jenkins  [facility] Number: [id number]  Date of Treatment: 05/10/2019   Diagnosis:   Encounter Diagnoses   Name Primary?    Right leg weakness     Right knee pain, unspecified chronicity        Time in: 0419  Time Out: 1710  Total Treatment Time: 51      Subjective:    Tahira reports no changes since last visit.  Patient reports their pain to be 3/10 on a 0-10 scale with 0 being no pain and 10 being the worst pain imaginable.    Objective  Tahira received therapeutic exercises to develop strength, endurance, ROM and flexibility for 41 minutes including:   X 10 min NuStep (L4)   X 20 seated sarah. LE there ex = marching (2#), LAQ (2#), ball sq, hip ABD (GTB)   X 20 supine rt LE there ex = SLR (AA), SAQ (2#), HS, hip ABD/ADD (AA)   X 20 ea AirEx mini sq and HR    X 20 ea up/down 6-in step = forwards/sideways   X 10 min ice rt knee      Written Home Exercises Provided: Cont with HEP  Pt demo good understanding of the education provided. Tahira demonstrated good return demonstration of activities.     Assessment:   Patient unable to perform SLR or abd in supine without AA    Pt will continue to benefit from skilled PT intervention. Medical Necessity is demonstrated by:  Fall Risk, Pain limits function of effected part for some activities, Requires skilled supervision to complete and progress HEP and Weakness.    Patient is making good progress towards established goals.    Plan:  Continue with established Plan of Care towards PT goals.   
If you are a smoker, it is important for your health to stop smoking. Please be aware that second hand smoke is also harmful.
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
## 2022-04-22 DIAGNOSIS — E87.1 HYPO-OSMOLALITY AND HYPONATREMIA: ICD-10-CM

## 2022-04-22 DIAGNOSIS — F10.10 ALCOHOL ABUSE, UNCOMPLICATED: ICD-10-CM

## 2022-04-22 DIAGNOSIS — F17.200 NICOTINE DEPENDENCE, UNSPECIFIED, UNCOMPLICATED: ICD-10-CM

## 2022-04-22 DIAGNOSIS — I10 ESSENTIAL (PRIMARY) HYPERTENSION: ICD-10-CM

## 2022-04-22 DIAGNOSIS — F31.9 BIPOLAR DISORDER, UNSPECIFIED: ICD-10-CM

## 2022-04-22 DIAGNOSIS — T50.2X5A ADVERSE EFFECT OF CARBONIC-ANHYDRASE INHIBITORS, BENZOTHIADIAZIDES AND OTHER DIURETICS, INITIAL ENCOUNTER: ICD-10-CM

## 2022-04-26 NOTE — H&P PST ADULT - PAIN, FACTORS THAT RELIEVE, PROFILE
- You must understand that you have received an Urgent Care treatment only and that you may be released before all of your medical problems are known or treated.   - You, the patient, will arrange for follow up care as instructed.   - If your condition worsens or fails to improve we recommend that you receive another evaluation at the ER immediately or contact your PCP to discuss your concerns.   - You can call [phone] or [phone] to help schedule an appointment with the appropriate provider.    ER if symptoms persist or worsen.   Follow up with your primary care provider if symptoms continue   
rest

## 2022-04-27 NOTE — ASU PATIENT PROFILE, ADULT - FALL HARM RISK - UNIVERSAL INTERVENTIONS
Bed in lowest position, wheels locked, appropriate side rails in place/Call bell, personal items and telephone in reach/Instruct patient to call for assistance before getting out of bed or chair/Non-slip footwear when patient is out of bed/[place] to call system/Physically safe environment - no spills, clutter or unnecessary equipment/Purposeful Proactive Rounding/Room/bathroom lighting operational, light cord in reach

## 2022-04-28 ENCOUNTER — TRANSCRIPTION ENCOUNTER (OUTPATIENT)
Age: 52
End: 2022-04-28

## 2022-04-28 ENCOUNTER — OUTPATIENT (OUTPATIENT)
Dept: OUTPATIENT SERVICES | Facility: HOSPITAL | Age: 52
LOS: 1 days | Discharge: HOME | End: 2022-04-28
Payer: MEDICAID

## 2022-04-28 ENCOUNTER — RESULT REVIEW (OUTPATIENT)
Age: 52
End: 2022-04-28

## 2022-04-28 VITALS
HEART RATE: 103 BPM | RESPIRATION RATE: 20 BRPM | SYSTOLIC BLOOD PRESSURE: 150 MMHG | WEIGHT: 179.02 LBS | HEIGHT: 67 IN | DIASTOLIC BLOOD PRESSURE: 82 MMHG | OXYGEN SATURATION: 94 % | TEMPERATURE: 98 F

## 2022-04-28 VITALS
HEART RATE: 99 BPM | DIASTOLIC BLOOD PRESSURE: 87 MMHG | SYSTOLIC BLOOD PRESSURE: 148 MMHG | OXYGEN SATURATION: 99 % | RESPIRATION RATE: 19 BRPM

## 2022-04-28 DIAGNOSIS — S83.249A OTHER TEAR OF MEDIAL MENISCUS, CURRENT INJURY, UNSPECIFIED KNEE, INITIAL ENCOUNTER: ICD-10-CM

## 2022-04-28 PROCEDURE — 88304 TISSUE EXAM BY PATHOLOGIST: CPT | Mod: 26

## 2022-04-28 RX ORDER — HYDROMORPHONE HYDROCHLORIDE 2 MG/ML
1 INJECTION INTRAMUSCULAR; INTRAVENOUS; SUBCUTANEOUS
Refills: 0 | Status: DISCONTINUED | OUTPATIENT
Start: 2022-04-28 | End: 2022-04-28

## 2022-04-28 RX ORDER — AMLODIPINE BESYLATE 2.5 MG/1
1 TABLET ORAL
Qty: 0 | Refills: 0 | DISCHARGE

## 2022-04-28 RX ORDER — OLANZAPINE 15 MG/1
1 TABLET, FILM COATED ORAL
Qty: 0 | Refills: 0 | DISCHARGE

## 2022-04-28 RX ORDER — CLONAZEPAM 1 MG
1 TABLET ORAL
Qty: 0 | Refills: 0 | DISCHARGE

## 2022-04-28 RX ORDER — ONDANSETRON 8 MG/1
4 TABLET, FILM COATED ORAL ONCE
Refills: 0 | Status: DISCONTINUED | OUTPATIENT
Start: 2022-04-28 | End: 2022-05-12

## 2022-04-28 RX ORDER — SERTRALINE 25 MG/1
1 TABLET, FILM COATED ORAL
Qty: 0 | Refills: 0 | DISCHARGE

## 2022-04-28 RX ORDER — SODIUM CHLORIDE 9 MG/ML
1000 INJECTION, SOLUTION INTRAVENOUS
Refills: 0 | Status: DISCONTINUED | OUTPATIENT
Start: 2022-04-28 | End: 2022-05-12

## 2022-04-28 RX ORDER — HYDROMORPHONE HYDROCHLORIDE 2 MG/ML
0.5 INJECTION INTRAMUSCULAR; INTRAVENOUS; SUBCUTANEOUS
Refills: 0 | Status: DISCONTINUED | OUTPATIENT
Start: 2022-04-28 | End: 2022-04-28

## 2022-04-28 NOTE — CHART NOTE - NSCHARTNOTEFT_GEN_A_CORE
PACU ANESTHESIA ADMISSION NOTE      Procedure: Arthroscopic medial meniscectomy      Post op diagnosis:  Acute medial meniscal tear        ____  Intubated  TV:______       Rate: ______      FiO2: ______    _x___  Patent Airway    _x___  Full return of protective reflexes    _x___  Full recovery from anesthesia / back to baseline status    Vitals:  T(C): 97.6  HR: 119  BP: 180/94  RR: 19  SpO2: 99%    Mental Status:  _x___ Awake   _____ Alert   _____ Drowsy   _____ Sedated    Nausea/Vomiting:  _x___  NO       ______Yes,   See Post - Op Orders         Pain Scale (0-10):  __0___    Treatment: _x___ None    ____ See Post - Op/PCA Orders    Post - Operative Fluids:   __x__ Oral   ____ See Post - Op Orders    Plan: Discharge:   _x___Home       _____Floor     _____Critical Care    _____  Other:_________________    Comments:  No anesthesia issues or complications noted.  Discharge when criteria met.

## 2022-04-28 NOTE — ASU DISCHARGE PLAN (ADULT/PEDIATRIC) - NS MD DC FALL RISK RISK
For information on Fall & Injury Prevention, visit: [email or web address] OR  [email or web address] OR  https://www.cdc.gov/steadi/patient.html

## 2022-04-28 NOTE — ASU DISCHARGE PLAN (ADULT/PEDIATRIC) - ASU DC SPECIAL INSTRUCTIONSFT
Post-Operative Knee Arthroscopy Instructions    Anesthesia:  - No Alcoholic beverages, including beer and wine, for 24 hours or while on prescribed pain medications  - Do not make any important decisions or sign any legal documents  - Do not drive or operate machinery for 24 hours  - You are required upon discharge to leave the surgical center with a responsible adult who will drive you home    Surgery:  - Stay indoors for 2 days  - Continue weight bearing as tolerated - only use crutches for severe pain  - Stairs can be done one step at a time  - Keep clean and dry for 3 days  - Remove dressing in 3 days and cover wounds with band-aids, then you can shower  - If knee is swollen, ice for 10 minutes, 3 times daily while awake  - Ankle range of motion 10 times every hour when awake to both lower extremities for 3 days.  This increases circulation and decreases swelling and decreases the chance for clots  - Take pain medication as prescribed  - Resume regular diet  - Follow-up in approximately 1 week    FOR QUESTIONS OR CONCERNS, CALL [phone]    Notify your doctor if you develop: fever, chills, excessive sweating, drainage, pain not controlled by pain medication      IF AN EMERGENCY ARISES CALL [phone] AND/OR GO TO THE EMERGENCY ROOM    DR. TRACEY  [phone]

## 2022-05-02 DIAGNOSIS — F17.210 NICOTINE DEPENDENCE, CIGARETTES, UNCOMPLICATED: ICD-10-CM

## 2022-05-02 DIAGNOSIS — S83.241A OTHER TEAR OF MEDIAL MENISCUS, CURRENT INJURY, RIGHT KNEE, INITIAL ENCOUNTER: ICD-10-CM
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
## 2022-05-02 DIAGNOSIS — M65.861 OTHER SYNOVITIS AND TENOSYNOVITIS, RIGHT LOWER LEG: ICD-10-CM

## 2022-05-02 DIAGNOSIS — I10 ESSENTIAL (PRIMARY) HYPERTENSION: ICD-10-CM
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
## 2022-05-02 DIAGNOSIS — M94.261 CHONDROMALACIA, RIGHT KNEE: ICD-10-CM
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
## 2022-05-02 LAB — SURGICAL PATHOLOGY STUDY: SIGNIFICANT CHANGE UP

## 2022-06-29 ENCOUNTER — APPOINTMENT (OUTPATIENT)
Dept: ORTHOPEDIC SURGERY | Facility: CLINIC | Age: 52
End: 2022-06-29

## 2022-09-21 ENCOUNTER — APPOINTMENT (OUTPATIENT)
Dept: ORTHOPEDIC SURGERY | Facility: CLINIC | Age: 52
End: 2022-09-21

## 2022-09-21 PROCEDURE — 99213 OFFICE O/P EST LOW 20 MIN: CPT

## 2022-09-21 NOTE — DISCUSSION/SUMMARY
[de-identified] :   I recommend she return to formal physical therapy, Rx provided for that.  She may continue taking Tylenol on a as needed basis.  I will see her back in 2 months for further evaluation.\par \par Supervising physician:  Dr. Cintron

## 2022-09-21 NOTE — HISTORY OF PRESENT ILLNESS
[de-identified] : The patient is a 51-year-old female here for subsequent re-evaluation of her right knee.  She is status post right knee arthroscopy, medial meniscectomy in April 2022.  She has good and bad days with the knee.  She only did a few sessions of formal physical therapy and then was not able to attend.  She uses Tylenol for pain and when she takes the medication, it helps her.

## 2022-11-21 ENCOUNTER — APPOINTMENT (OUTPATIENT)
Dept: ORTHOPEDIC SURGERY | Facility: CLINIC | Age: 52
End: 2022-11-21

## 2023-02-22 ENCOUNTER — APPOINTMENT (OUTPATIENT)
Dept: ORTHOPEDIC SURGERY | Facility: CLINIC | Age: 53
End: 2023-02-22

## 2023-04-04 ENCOUNTER — APPOINTMENT (OUTPATIENT)
Dept: ORTHOPEDIC SURGERY | Facility: CLINIC | Age: 53
End: 2023-04-04
Payer: MEDICAID

## 2023-04-04 PROCEDURE — 99213 OFFICE O/P EST LOW 20 MIN: CPT

## 2023-04-04 NOTE — DISCUSSION/SUMMARY
[de-identified] : Today the patient I discussed recommend treatment for osteoarthritis.  She may continue taking Tylenol and her pain cream.  She may be a candidate for cortisone in the future.  She already does exercising and walking so she will continue that.  I will see her back in 2 months for further evaluation.  If her symptoms worsen prior to that she will call me sooner.\par \par \par Supervising Physician: Dr. Cintron

## 2023-04-04 NOTE — HISTORY OF PRESENT ILLNESS
[de-identified] : The patient is a 52-year-old female here for a subsequent reevaluation of her right knee.  She is status post right arthroscopy in April 2022.  She does get some pain in her knee, she points anteriorly as to where the pain is.  There is going from seated to standing position.  It occurs with cold weather.  She uses Tylenol and what she termed an all-natural pain cream which provides her with some relief.  She last had some formal physical therapy 6 months ago.

## 2023-04-04 NOTE — PHYSICAL EXAM
[Right] : right knee [NL (0)] : extension 0 degrees [] : non-antalgic [TWNoteComboBox7] : flexion 125 degrees

## 2023-06-05 ENCOUNTER — APPOINTMENT (OUTPATIENT)
Dept: ORTHOPEDIC SURGERY | Facility: CLINIC | Age: 53
End: 2023-06-05
Payer: MEDICAID

## 2023-06-05 DIAGNOSIS — S83.231D COMPLEX TEAR OF MEDIAL MENISCUS, CURRENT INJURY, RIGHT KNEE, SUBSEQUENT ENCOUNTER: ICD-10-CM

## 2023-06-05 PROCEDURE — 99213 OFFICE O/P EST LOW 20 MIN: CPT | Mod: 25

## 2023-06-05 PROCEDURE — 20611 DRAIN/INJ JOINT/BURSA W/US: CPT | Mod: RT

## 2023-06-05 PROCEDURE — 73562 X-RAY EXAM OF KNEE 3: CPT | Mod: RT

## 2023-06-05 NOTE — HISTORY OF PRESENT ILLNESS
[de-identified] : The patient is a 52-year-old female here for a subsequent reevaluation of her right knee.  She is status post right arthroscopy in April 2022.  She is still having pain in her right knee.  The pain occurs when going up and down stairs.  She also reports that her left knee is starting to bother her.  She has tried ibuprofen and Tylenol along with a cream that she uses for her knee.

## 2023-06-05 NOTE — DISCUSSION/SUMMARY
[de-identified] : I reviewed the x-ray findings with the patient.  Today I recommend a cortisone injection for the right knee, the patient agreed.  The right knee was injected with 2 cc lidocaine, 1 cc dexamethasone.  Procedure note generated.  She may continue taking Tylenol and ibuprofen on as-needed basis.  I will see her in 3 months for further evaluation Yes

## 2023-06-05 NOTE — PROCEDURE
[Large Joint Injection] : Large joint injection [Right] : of the right [Knee] : knee [___ cc    1%] : Lidocaine ~Vcc of 1%  [___ cc    4mg] : Dexamethasone (Decadron) ~Vcc of 4 mg  [Risks, benefits, alternatives discussed / Verbal consent obtained] : the risks benefits, and alternatives have been discussed, and verbal consent was obtained [All ultrasound images have been permanently captured and stored accordingly in our picture archiving and communication system] : All ultrasound images have been permanently captured and stored accordingly in our picture archiving and communication system [Visualization of the needle and placement of injection was performed without complication] : visualization of the needle and placement of injection was performed without complication

## 2023-06-05 NOTE — DATA REVIEWED
[Right] : of the right [Knee] : knee [FreeTextEntry1] : 3 views of the right knee were obtained here in the office today and show: Well-preserved medial and lateral compartments.  No soft tissue calcifications or bone masses.  No fractures.

## 2023-09-12 ENCOUNTER — APPOINTMENT (OUTPATIENT)
Dept: ORTHOPEDIC SURGERY | Facility: CLINIC | Age: 53
End: 2023-09-12

## 2023-10-10 ENCOUNTER — APPOINTMENT (OUTPATIENT)
Dept: ORTHOPEDIC SURGERY | Facility: CLINIC | Age: 53
End: 2023-10-10

## 2023-10-30 ENCOUNTER — APPOINTMENT (OUTPATIENT)
Dept: ORTHOPEDIC SURGERY | Facility: CLINIC | Age: 53
End: 2023-10-30
Payer: MEDICAID

## 2023-10-30 VITALS — WEIGHT: 165 LBS | BODY MASS INDEX: 25.9 KG/M2 | HEIGHT: 67 IN

## 2023-10-30 DIAGNOSIS — M94.261 CHONDROMALACIA, RIGHT KNEE: ICD-10-CM

## 2023-10-30 PROCEDURE — 99213 OFFICE O/P EST LOW 20 MIN: CPT | Mod: 25

## 2023-10-30 PROCEDURE — 20611 DRAIN/INJ JOINT/BURSA W/US: CPT | Mod: RT

## 2023-11-29 ENCOUNTER — APPOINTMENT (OUTPATIENT)
Dept: ORTHOPEDIC SURGERY | Facility: CLINIC | Age: 53
End: 2023-11-29
Payer: MEDICAID

## 2023-11-29 DIAGNOSIS — M25.562 PAIN IN LEFT KNEE: ICD-10-CM

## 2023-11-29 DIAGNOSIS — M94.262 CHONDROMALACIA, LEFT KNEE: ICD-10-CM

## 2023-11-29 PROCEDURE — 20611 DRAIN/INJ JOINT/BURSA W/US: CPT | Mod: LT

## 2023-11-29 PROCEDURE — 73562 X-RAY EXAM OF KNEE 3: CPT | Mod: LT

## 2023-11-29 PROCEDURE — 99213 OFFICE O/P EST LOW 20 MIN: CPT | Mod: 25

## 2024-01-29 ENCOUNTER — APPOINTMENT (OUTPATIENT)
Dept: ORTHOPEDIC SURGERY | Facility: CLINIC | Age: 54
End: 2024-01-29

## 2025-04-14 NOTE — ED PROVIDER NOTE - NS ED MD DISPO DISCHARGE
"FOLLOW UP: call Mom back    REASON FOR CONVERSATION: Hand and Foot Swelling    SYMPTOMS: swollen/red hands and feet    OTHER: Mom reports she first noticed child's hands and feet were red and swollen 2 days ago. Mom states child's feet have some pitting edema. Child denies any pain or itching. Mom states child had been sick with congestion and cough. No fevers today. Mom sent video through [employer] of hands and feet. Mom looking for recommendation from provider.     DISPOSITION: Discuss With PCP and Callback by Nurse Today (overriding See PCP Within 24 Hours)    Reason for Disposition   [1] Swelling of both ankles/feet AND [2] MILD (Exception: heat edema)    Answer Assessment - Initial Assessment Questions  1. ONSET: \"When did the swelling start?\" (e.g., minutes, hours, days)      2 days    2. LOCATION: \"What part of the leg is swollen?\"  \"Are both legs swollen or just one leg?\"      Both hands and feet   3. DEGREE OF SWELLING: \"How large is the swelling?\"       Feet are pitting edema   4. SEVERITY of WIDESPREAD SWELLING (e.g., Edema): \"How bad is the swelling?\"      Spreading to ankle   5. REDNESS: \"Does the swelling look red or infected?\"      Worse in feet than hands   6. PAIN: \"Is there any pain?\" If so, ask, \"How bad is it?\"      No  7. ITCH: \"Does the swelling itch?\" If so, ask, \"How much?\"      No  8. CAUSE: \"What do you think caused the swelling?\"       Unsure   9. CHRONIC DISEASE: \"Does your child have kidney, heart or liver disease?\"      No    Protocols used: Leg or Foot Swelling-Pediatric-    " Home